# Patient Record
Sex: FEMALE | Race: WHITE | NOT HISPANIC OR LATINO | Employment: FULL TIME | URBAN - METROPOLITAN AREA
[De-identification: names, ages, dates, MRNs, and addresses within clinical notes are randomized per-mention and may not be internally consistent; named-entity substitution may affect disease eponyms.]

---

## 2019-11-27 ENCOUNTER — OFFICE VISIT (OUTPATIENT)
Dept: FAMILY MEDICINE CLINIC | Facility: CLINIC | Age: 44
End: 2019-11-27

## 2019-11-27 VITALS
BODY MASS INDEX: 36.06 KG/M2 | HEIGHT: 65 IN | DIASTOLIC BLOOD PRESSURE: 88 MMHG | HEART RATE: 71 BPM | WEIGHT: 216.4 LBS | SYSTOLIC BLOOD PRESSURE: 110 MMHG | TEMPERATURE: 98 F | OXYGEN SATURATION: 98 %

## 2019-11-27 DIAGNOSIS — R51.9 OCULAR HEADACHE: Primary | ICD-10-CM

## 2019-11-27 DIAGNOSIS — Z23 NEEDS FLU SHOT: ICD-10-CM

## 2019-11-27 DIAGNOSIS — M72.2 PLANTAR FASCIITIS OF LEFT FOOT: ICD-10-CM

## 2019-11-27 PROCEDURE — 90686 IIV4 VACC NO PRSV 0.5 ML IM: CPT | Performed by: NURSE PRACTITIONER

## 2019-11-27 PROCEDURE — 90471 IMMUNIZATION ADMIN: CPT | Performed by: NURSE PRACTITIONER

## 2019-11-27 PROCEDURE — 99213 OFFICE O/P EST LOW 20 MIN: CPT | Performed by: NURSE PRACTITIONER

## 2019-11-27 NOTE — PROGRESS NOTES
Subjective   Torri Marlow is a 43 y.o. female.     Chief Complaint   Patient presents with   • Establish Care     seen shila flowers at the old office        History of Present Illness     Patient is here today to establish care as a new patient.  She was previous with Shila hirsch, but hadn't saw her since early 2018.       C/o L heel pain for a long time.  Has had plantar fascitis in the past and it resolved.  Has gotten inserts recently and does stretches at home.     OTC: nothing.     Does want a flu shot today    PAP smear-2 years ago-normal.    Srmofljgi-brusc-beuenlks one today    NO other complaints today      The following portions of the patient's history were reviewed and updated as appropriate: allergies, current medications, past family history, past medical history, past social history, past surgical history and problem list.    History reviewed. No pertinent past medical history.    History reviewed. No pertinent surgical history.    Family History   Problem Relation Age of Onset   • Hypertension Mother    • Heart failure Mother    • Pancreatitis Mother    • Heart disease Father    • Hypertension Father        Social History     Socioeconomic History   • Marital status:      Spouse name: Not on file   • Number of children: Not on file   • Years of education: Not on file   • Highest education level: Not on file   Tobacco Use   • Smoking status: Never Smoker   • Smokeless tobacco: Never Used   Substance and Sexual Activity   • Alcohol use: No     Frequency: Never   • Drug use: No   • Sexual activity: Yes     Partners: Male     Birth control/protection: Partner's vasectomy       No current outpatient medications on file.    Review of Systems   Constitutional: Negative for fatigue and fever.   Respiratory: Negative for cough, shortness of breath and wheezing.    Cardiovascular: Negative for chest pain.   Gastrointestinal: Negative for abdominal pain, constipation, diarrhea, nausea and vomiting.  "  Genitourinary: Negative for breast discharge, breast lump, breast pain, dysuria, urgency, vaginal discharge and vaginal pain.   Musculoskeletal:        L heel pain   Skin:        Gets itchy rash on abdomen that comes and goes.   Will go away on its own without medication   Neurological: Positive for headache (ocular migraines-30 minutes then goes away completely ). Negative for dizziness.   Psychiatric/Behavioral: Negative for depressed mood. The patient is not nervous/anxious.        Objective   Vitals:    11/27/19 1314   BP: 110/88   Pulse: 71   Temp: 98 °F (36.7 °C)   SpO2: 98%   Weight: 98.2 kg (216 lb 6.4 oz)   Height: 165.1 cm (65\")     Body mass index is 36.01 kg/m².  Physical Exam   Constitutional: She is oriented to person, place, and time. She appears well-developed and well-nourished.   Cardiovascular: Normal rate, regular rhythm, normal heart sounds and intact distal pulses.   Pulmonary/Chest: Effort normal and breath sounds normal.        Neurological: She is alert and oriented to person, place, and time.   Psychiatric: She has a normal mood and affect. Her behavior is normal. Judgment and thought content normal.         Assessment/Plan   Torri was seen today for establish care.    Diagnoses and all orders for this visit:    Ocular headache    Needs flu shot    BMI 36.0-36.9,adult    Plantar fasciitis of left foot    Other orders  -     Fluarix/Fluzone/Afluria Quad>6 Months                 Patient Instructions   Plantar Fasciitis Rehab  Ask your health care provider which exercises are safe for you. Do exercises exactly as told by your health care provider and adjust them as directed. It is normal to feel mild stretching, pulling, tightness, or discomfort as you do these exercises, but you should stop right away if you feel sudden pain or your pain gets worse. Do not begin these exercises until told by your health care provider.  Stretching and range of motion exercises  These exercises warm up your " muscles and joints and improve the movement and flexibility of your foot. These exercises also help to relieve pain.  Exercise A: Plantar fascia stretch    1. Sit with your left / right leg crossed over your opposite knee.  2. Hold your heel with one hand with that thumb near your arch. With your other hand, hold your toes and gently pull them back toward the top of your foot. You should feel a stretch on the bottom of your toes or your foot or both.  3. Hold this stretch for__________ seconds.  4. Slowly release your toes and return to the starting position.  Repeat __________ times. Complete this exercise __________ times a day.  Exercise B: Gastroc, standing    1. Stand with your hands against a wall.  2. Extend your left / right leg behind you, and bend your front knee slightly.  3. Keeping your heels on the floor and keeping your back knee straight, shift your weight toward the wall without arching your back. You should feel a gentle stretch in your left / right calf.  4. Hold this position for __________ seconds.  Repeat __________ times. Complete this exercise __________ times a day.  Exercise C: Soleus, standing  1. Stand with your hands against a wall.  2. Extend your left / right leg behind you, and bend your front knee slightly.  3. Keeping your heels on the floor, bend your back knee and slightly shift your weight over the back leg. You should feel a gentle stretch deep in your calf.  4. Hold this position for __________ seconds.  Repeat __________ times. Complete this exercise __________ times a day.  Exercise D: Gastrocsoleus, standing  1. Stand with the ball of your left / right foot on a step. The ball of your foot is on the walking surface, right under your toes.  2. Keep your other foot firmly on the same step.  3. Hold onto the wall or a railing for balance.  4. Slowly lift your other foot, allowing your body weight to press your heel down over the edge of the step. You should feel a stretch in  your left / right calf.  5. Hold this position for __________ seconds.  6. Return both feet to the step.  7. Repeat this exercise with a slight bend in your left / right knee.  Repeat __________ times with your left / right knee straight and __________ times with your left / right knee bent. Complete this exercise __________ times a day.  Balance exercise  This exercise builds your balance and strength control of your arch to help take pressure off your plantar fascia.  Exercise E: Single leg stand  1. Without shoes, stand near a railing or in a doorway. You may hold onto the railing or door frame as needed.  2. Stand on your left / right foot. Keep your big toe down on the floor and try to keep your arch lifted. Do not let your foot roll inward.  3. Hold this position for __________ seconds.  4. If this exercise is too easy, you can try it with your eyes closed or while standing on a pillow.  Repeat __________ times. Complete this exercise __________ times a day.  This information is not intended to replace advice given to you by your health care provider. Make sure you discuss any questions you have with your health care provider.  Document Released: 12/18/2006 Document Revised: 08/22/2017 Document Reviewed: 10/31/2016  Elsevier Interactive Patient Education © 2019 [employer]

## 2019-11-27 NOTE — PATIENT INSTRUCTIONS
Plantar Fasciitis Rehab  Ask your health care provider which exercises are safe for you. Do exercises exactly as told by your health care provider and adjust them as directed. It is normal to feel mild stretching, pulling, tightness, or discomfort as you do these exercises, but you should stop right away if you feel sudden pain or your pain gets worse. Do not begin these exercises until told by your health care provider.  Stretching and range of motion exercises  These exercises warm up your muscles and joints and improve the movement and flexibility of your foot. These exercises also help to relieve pain.  Exercise A: Plantar fascia stretch    1. Sit with your left / right leg crossed over your opposite knee.  2. Hold your heel with one hand with that thumb near your arch. With your other hand, hold your toes and gently pull them back toward the top of your foot. You should feel a stretch on the bottom of your toes or your foot or both.  3. Hold this stretch for__________ seconds.  4. Slowly release your toes and return to the starting position.  Repeat __________ times. Complete this exercise __________ times a day.  Exercise B: Gastroc, standing    1. Stand with your hands against a wall.  2. Extend your left / right leg behind you, and bend your front knee slightly.  3. Keeping your heels on the floor and keeping your back knee straight, shift your weight toward the wall without arching your back. You should feel a gentle stretch in your left / right calf.  4. Hold this position for __________ seconds.  Repeat __________ times. Complete this exercise __________ times a day.  Exercise C: Soleus, standing  1. Stand with your hands against a wall.  2. Extend your left / right leg behind you, and bend your front knee slightly.  3. Keeping your heels on the floor, bend your back knee and slightly shift your weight over the back leg. You should feel a gentle stretch deep in your calf.  4. Hold this position for  __________ seconds.  Repeat __________ times. Complete this exercise __________ times a day.  Exercise D: Gastrocsoleus, standing  1. Stand with the ball of your left / right foot on a step. The ball of your foot is on the walking surface, right under your toes.  2. Keep your other foot firmly on the same step.  3. Hold onto the wall or a railing for balance.  4. Slowly lift your other foot, allowing your body weight to press your heel down over the edge of the step. You should feel a stretch in your left / right calf.  5. Hold this position for __________ seconds.  6. Return both feet to the step.  7. Repeat this exercise with a slight bend in your left / right knee.  Repeat __________ times with your left / right knee straight and __________ times with your left / right knee bent. Complete this exercise __________ times a day.  Balance exercise  This exercise builds your balance and strength control of your arch to help take pressure off your plantar fascia.  Exercise E: Single leg stand  1. Without shoes, stand near a railing or in a doorway. You may hold onto the railing or door frame as needed.  2. Stand on your left / right foot. Keep your big toe down on the floor and try to keep your arch lifted. Do not let your foot roll inward.  3. Hold this position for __________ seconds.  4. If this exercise is too easy, you can try it with your eyes closed or while standing on a pillow.  Repeat __________ times. Complete this exercise __________ times a day.  This information is not intended to replace advice given to you by your health care provider. Make sure you discuss any questions you have with your health care provider.  Document Released: 12/18/2006 Document Revised: 08/22/2017 Document Reviewed: 10/31/2016  Elsevier Interactive Patient Education © 2019 [employer]

## 2020-05-27 ENCOUNTER — OFFICE VISIT (OUTPATIENT)
Dept: FAMILY MEDICINE CLINIC | Facility: CLINIC | Age: 45
End: 2020-05-27

## 2020-05-27 VITALS
DIASTOLIC BLOOD PRESSURE: 66 MMHG | OXYGEN SATURATION: 99 % | BODY MASS INDEX: 37.12 KG/M2 | TEMPERATURE: 97.3 F | HEIGHT: 65 IN | WEIGHT: 222.8 LBS | HEART RATE: 68 BPM | SYSTOLIC BLOOD PRESSURE: 108 MMHG

## 2020-05-27 DIAGNOSIS — M72.2 PLANTAR FASCIITIS OF LEFT FOOT: ICD-10-CM

## 2020-05-27 DIAGNOSIS — Z00.01 ENCOUNTER FOR GENERAL ADULT MEDICAL EXAMINATION WITH ABNORMAL FINDINGS: Primary | ICD-10-CM

## 2020-05-27 DIAGNOSIS — R51.9 OCULAR HEADACHE: ICD-10-CM

## 2020-05-27 PROCEDURE — 99396 PREV VISIT EST AGE 40-64: CPT | Performed by: NURSE PRACTITIONER

## 2020-05-27 NOTE — PATIENT INSTRUCTIONS
Preventive Care 40-64 Years Old, Female  Preventive care refers to visits with your health care provider and lifestyle choices that can promote health and wellness. This includes:  · A yearly physical exam. This may also be called an annual well check.  · Regular dental visits and eye exams.  · Immunizations.  · Screening for certain conditions.  · Healthy lifestyle choices, such as eating a healthy diet, getting regular exercise, not using drugs or products that contain nicotine and tobacco, and limiting alcohol use.  What can I expect for my preventive care visit?  Physical exam  Your health care provider will check your:  · Height and weight. This may be used to calculate body mass index (BMI), which tells if you are at a healthy weight.  · Heart rate and blood pressure.  · Skin for abnormal spots.  Counseling  Your health care provider may ask you questions about your:  · Alcohol, tobacco, and drug use.  · Emotional well-being.  · Home and relationship well-being.  · Sexual activity.  · Eating habits.  · Work and work environment.  · Method of birth control.  · Menstrual cycle.  · Pregnancy history.  What immunizations do I need?    Influenza (flu) vaccine  · This is recommended every year.  Tetanus, diphtheria, and pertussis (Tdap) vaccine  · You may need a Td booster every 10 years.  Varicella (chickenpox) vaccine  · You may need this if you have not been vaccinated.  Zoster (shingles) vaccine  · You may need this after age 60.  Measles, mumps, and rubella (MMR) vaccine  · You may need at least one dose of MMR if you were born in 1957 or later. You may also need a second dose.  Pneumococcal conjugate (PCV13) vaccine  · You may need this if you have certain conditions and were not previously vaccinated.  Pneumococcal polysaccharide (PPSV23) vaccine  · You may need one or two doses if you smoke cigarettes or if you have certain conditions.  Meningococcal conjugate (MenACWY) vaccine  · You may need this if you  have certain conditions.  Hepatitis A vaccine  · You may need this if you have certain conditions or if you travel or work in places where you may be exposed to hepatitis A.  Hepatitis B vaccine  · You may need this if you have certain conditions or if you travel or work in places where you may be exposed to hepatitis B.  Haemophilus influenzae type b (Hib) vaccine  · You may need this if you have certain conditions.  Human papillomavirus (HPV) vaccine  · If recommended by your health care provider, you may need three doses over 6 months.  You may receive vaccines as individual doses or as more than one vaccine together in one shot (combination vaccines). Talk with your health care provider about the risks and benefits of combination vaccines.  What tests do I need?  Blood tests  · Lipid and cholesterol levels. These may be checked every 5 years, or more frequently if you are over 50 years old.  · Hepatitis C test.  · Hepatitis B test.  Screening  · Lung cancer screening. You may have this screening every year starting at age 55 if you have a 30-pack-year history of smoking and currently smoke or have quit within the past 15 years.  · Colorectal cancer screening. All adults should have this screening starting at age 50 and continuing until age 75. Your health care provider may recommend screening at age 45 if you are at increased risk. You will have tests every 1-10 years, depending on your results and the type of screening test.  · Diabetes screening. This is done by checking your blood sugar (glucose) after you have not eaten for a while (fasting). You may have this done every 1-3 years.  · Mammogram. This may be done every 1-2 years. Talk with your health care provider about when you should start having regular mammograms. This may depend on whether you have a family history of breast cancer.  · BRCA-related cancer screening. This may be done if you have a family history of breast, ovarian, tubal, or peritoneal  cancers.  · Pelvic exam and Pap test. This may be done every 3 years starting at age 21. Starting at age 30, this may be done every 5 years if you have a Pap test in combination with an HPV test.  Other tests  · Sexually transmitted disease (STD) testing.  · Bone density scan. This is done to screen for osteoporosis. You may have this scan if you are at high risk for osteoporosis.  Follow these instructions at home:  Eating and drinking  · Eat a diet that includes fresh fruits and vegetables, whole grains, lean protein, and low-fat dairy.  · Take vitamin and mineral supplements as recommended by your health care provider.  · Do not drink alcohol if:  ? Your health care provider tells you not to drink.  ? You are pregnant, may be pregnant, or are planning to become pregnant.  · If you drink alcohol:  ? Limit how much you have to 0-1 drink a day.  ? Be aware of how much alcohol is in your drink. In the [place] one drink equals one 12 oz bottle of beer (355 mL), one 5 oz glass of wine (148 mL), or one 1½ oz glass of hard liquor (44 mL).  Lifestyle  · Take daily care of your teeth and gums.  · Stay active. Exercise for at least 30 minutes on 5 or more days each week.  · Do not use any products that contain nicotine or tobacco, such as cigarettes, e-cigarettes, and chewing tobacco. If you need help quitting, ask your health care provider.  · If you are sexually active, practice safe sex. Use a condom or other form of birth control (contraception) in order to prevent pregnancy and STIs (sexually transmitted infections).  · If told by your health care provider, take low-dose aspirin daily starting at age 50.  What's next?  · Visit your health care provider once a year for a well check visit.  · Ask your health care provider how often you should have your eyes and teeth checked.  · Stay up to date on all vaccines.  This information is not intended to replace advice given to you by your health care provider. Make sure you  discuss any questions you have with your health care provider.  Document Released: 01/13/2017 Document Revised: 08/29/2019 Document Reviewed: 08/29/2019  Elsevier Patient Education © 2020 [employer]

## 2020-05-27 NOTE — PROGRESS NOTES
"Subjective   Torri Marlow is a 44 y.o. female.     Chief Complaint   Patient presents with   • Foot Pain     follow up        History of Present Illness       Patient is here today for follow up on plantar fasciitis pain.       The following portions of the patient's history were reviewed and updated as appropriate: allergies, current medications, past family history, past medical history, past social history, past surgical history and problem list.    Past Medical History:   Diagnosis Date   • Acute upper respiratory infection    • Acute viral pharyngitis    • Cough    • Discharge from the vagina    • Encounter for preventive health examination    • Leukocytes in urine    • Need for prophylactic vaccination and inoculation against influenza    • Need for tetanus booster    • Needs flu shot    • Pap smear for cervical cancer screening    • Ruptured tympanic membrane    • Yeast infection        History reviewed. No pertinent surgical history.    Family History   Problem Relation Age of Onset   • Hypertension Mother    • Heart failure Mother    • Pancreatitis Mother    • Heart disease Father    • Hypertension Father    • Gout Brother    • Heart disease Brother    • No Known Problems Other        Social History     Socioeconomic History   • Marital status:      Spouse name: Not on file   • Number of children: Not on file   • Years of education: Not on file   • Highest education level: Not on file   Tobacco Use   • Smoking status: Never Smoker   • Smokeless tobacco: Never Used   Substance and Sexual Activity   • Alcohol use: No     Frequency: Never   • Drug use: No   • Sexual activity: Yes     Partners: Male     Birth control/protection: Partner's vasectomy       No current outpatient medications on file.    Review of Systems    Objective   Vitals:    05/27/20 1532   BP: 108/66   Pulse: 68   Temp: 97.3 °F (36.3 °C)   SpO2: 99%   Weight: 101 kg (222 lb 12.8 oz)   Height: 165.1 cm (65\")     Body mass index is 37.08 " kg/m².  Physical Exam      Assessment/Plan   There are no diagnoses linked to this encounter.             There are no Patient Instructions on file for this visit.

## 2020-05-27 NOTE — PROGRESS NOTES
Subjective   Torri Marlow is a 44 y.o. female who presents for annual female wellness exam.  Chief Complaint   Patient presents with   • Foot Pain     follow up      Patient is here today for follow up on plantar fasciitis pain.   She states it took a long time, but is finally out of pain.     She also states that she has had a few more ocular headaches, but not many.   She states she had one last week.  Has an aura and then came back. She states usually she just goes to bed, but she wasn't able to.  Most of the time doesn't slow her down much, just are a annoyance.   Normally is no headache afterwards.      Had pap 7/2/2018  Mammogram- declines.     LABS.       Menstrual History: normal monthly.    Pregnancy History 3 pregnancy's   Sexual History: yes. 1 male partner  Contraception: partner's vasectomy  Hormone Replacement Therapy: n/a  Diet: not great.  Admits to too many calories in the last few months.  No routine with the current pandemic.  Drinks mostly water and diet coke   Exercise: walks several times weekly   Do you feel safe? yes  Have you ever been abused? denies    Tobacco use-denies  Alcohol use-denies  Drug use-denies    Mammogram: declines  Pap Smear: 7/2018  Bone Density: n/a  Colon Cancer Screening: n/a    Immunization History   Administered Date(s) Administered   • FLUARIX/FLUZONE/AFLURIA/FLULAVAL QUAD 11/27/2019   • Tdap 12/27/2016       The following portions of the patient's history were reviewed and updated as appropriate: allergies, current medications, past family history, past medical history, past social history, past surgical history and problem list.    Past Medical History:   Diagnosis Date   • Acute upper respiratory infection    • Acute viral pharyngitis    • Cough    • Discharge from the vagina    • Encounter for preventive health examination    • Leukocytes in urine    • Need for prophylactic vaccination and inoculation against influenza    • Need for tetanus booster    • Needs flu  shot    • Pap smear for cervical cancer screening    • Ruptured tympanic membrane    • Yeast infection        History reviewed. No pertinent surgical history.    Family History   Problem Relation Age of Onset   • Hypertension Mother    • Heart failure Mother    • Pancreatitis Mother    • Heart disease Father    • Hypertension Father    • Gout Brother    • Heart disease Brother    • No Known Problems Other        Social History     Socioeconomic History   • Marital status:      Spouse name: Not on file   • Number of children: Not on file   • Years of education: Not on file   • Highest education level: Not on file   Tobacco Use   • Smoking status: Never Smoker   • Smokeless tobacco: Never Used   Substance and Sexual Activity   • Alcohol use: No     Frequency: Never   • Drug use: No   • Sexual activity: Yes     Partners: Male     Birth control/protection: Partner's vasectomy       Review of Systems   Constitutional: Negative for fatigue and fever.   HENT: Negative for congestion and rhinorrhea.    Respiratory: Negative for cough, shortness of breath and wheezing.    Cardiovascular: Negative for chest pain and leg swelling.   Gastrointestinal: Negative for abdominal pain, constipation, diarrhea, nausea and vomiting.   Genitourinary: Negative for dysuria and urgency.   Skin: Negative.    Neurological: Negative for dizziness and headache.   Psychiatric/Behavioral: Negative for suicidal ideas and depressed mood. The patient is not nervous/anxious.        Objective   Vitals:    05/27/20 1532   BP: 108/66   Pulse: 68   Temp: 97.3 °F (36.3 °C)   SpO2: 99%     Body mass index is 37.08 kg/m².  Physical Exam   Constitutional: She is oriented to person, place, and time. She appears well-developed and well-nourished.   HENT:   Head: Normocephalic and atraumatic.   Right Ear: Tympanic membrane is perforated.   Left Ear: Tympanic membrane normal.   Nose: Nose normal. Right sinus exhibits no maxillary sinus tenderness and no  frontal sinus tenderness. Left sinus exhibits no maxillary sinus tenderness and no frontal sinus tenderness.   Mouth/Throat: Uvula is midline, oropharynx is clear and moist and mucous membranes are normal. Tonsils are 0 on the right. Tonsils are 0 on the left.   Eyes: Pupils are equal, round, and reactive to light.   Neck: Normal range of motion. Neck supple.   Cardiovascular: Normal rate, regular rhythm and normal heart sounds.   Pulmonary/Chest: Effort normal and breath sounds normal.   Abdominal: Soft. Bowel sounds are normal.   Musculoskeletal: Normal range of motion.   Neurological: She is alert and oriented to person, place, and time.   Skin: Skin is warm and dry.   Psychiatric: She has a normal mood and affect. Her behavior is normal. Judgment and thought content normal.         Assessment/Plan   Torri was seen today for foot pain.    Diagnoses and all orders for this visit:    Encounter for general adult medical examination with abnormal findings  -     CBC & Differential; Future  -     Comprehensive Metabolic Panel; Future  -     Lipid Panel; Future  -     TSH; Future    BMI 37.0-37.9, adult    Ocular headache    Plantar fasciitis of left foot      Will obtain labs on Friday and call with results.      Discussed loss of weight for general health.       Plantar fasciitis: if recurrence notify provider.    Ocular headache: if new issues with this, notify provider.       Discussed referral to get mammogram as general screening for breast cancer.  This is the best screening.  Patient verbalizes understanding, but does not want to proceed with option.      Follow up yearly, or sooner if labs indicate.            Discussed the importance of maintaining a healthy weight and getting regular exercise.  Educated patient on the benefits of healthy diet.  Advise follow-up annually for wellness exams.       Preventive Care 40-64 Years Old, Female  Preventive care refers to visits with your health care provider and  lifestyle choices that can promote health and wellness. This includes:  · A yearly physical exam. This may also be called an annual well check.  · Regular dental visits and eye exams.  · Immunizations.  · Screening for certain conditions.  · Healthy lifestyle choices, such as eating a healthy diet, getting regular exercise, not using drugs or products that contain nicotine and tobacco, and limiting alcohol use.  What can I expect for my preventive care visit?  Physical exam  Your health care provider will check your:  · Height and weight. This may be used to calculate body mass index (BMI), which tells if you are at a healthy weight.  · Heart rate and blood pressure.  · Skin for abnormal spots.  Counseling  Your health care provider may ask you questions about your:  · Alcohol, tobacco, and drug use.  · Emotional well-being.  · Home and relationship well-being.  · Sexual activity.  · Eating habits.  · Work and work environment.  · Method of birth control.  · Menstrual cycle.  · Pregnancy history.  What immunizations do I need?    Influenza (flu) vaccine  · This is recommended every year.  Tetanus, diphtheria, and pertussis (Tdap) vaccine  · You may need a Td booster every 10 years.  Varicella (chickenpox) vaccine  · You may need this if you have not been vaccinated.  Zoster (shingles) vaccine  · You may need this after age 60.  Measles, mumps, and rubella (MMR) vaccine  · You may need at least one dose of MMR if you were born in 1957 or later. You may also need a second dose.  Pneumococcal conjugate (PCV13) vaccine  · You may need this if you have certain conditions and were not previously vaccinated.  Pneumococcal polysaccharide (PPSV23) vaccine  · You may need one or two doses if you smoke cigarettes or if you have certain conditions.  Meningococcal conjugate (MenACWY) vaccine  · You may need this if you have certain conditions.  Hepatitis A vaccine  · You may need this if you have certain conditions or if you  travel or work in places where you may be exposed to hepatitis A.  Hepatitis B vaccine  · You may need this if you have certain conditions or if you travel or work in places where you may be exposed to hepatitis B.  Haemophilus influenzae type b (Hib) vaccine  · You may need this if you have certain conditions.  Human papillomavirus (HPV) vaccine  · If recommended by your health care provider, you may need three doses over 6 months.  You may receive vaccines as individual doses or as more than one vaccine together in one shot (combination vaccines). Talk with your health care provider about the risks and benefits of combination vaccines.  What tests do I need?  Blood tests  · Lipid and cholesterol levels. These may be checked every 5 years, or more frequently if you are over 50 years old.  · Hepatitis C test.  · Hepatitis B test.  Screening  · Lung cancer screening. You may have this screening every year starting at age 55 if you have a 30-pack-year history of smoking and currently smoke or have quit within the past 15 years.  · Colorectal cancer screening. All adults should have this screening starting at age 50 and continuing until age 75. Your health care provider may recommend screening at age 45 if you are at increased risk. You will have tests every 1-10 years, depending on your results and the type of screening test.  · Diabetes screening. This is done by checking your blood sugar (glucose) after you have not eaten for a while (fasting). You may have this done every 1-3 years.  · Mammogram. This may be done every 1-2 years. Talk with your health care provider about when you should start having regular mammograms. This may depend on whether you have a family history of breast cancer.  · BRCA-related cancer screening. This may be done if you have a family history of breast, ovarian, tubal, or peritoneal cancers.  · Pelvic exam and Pap test. This may be done every 3 years starting at age 21. Starting at age 30,  this may be done every 5 years if you have a Pap test in combination with an HPV test.  Other tests  · Sexually transmitted disease (STD) testing.  · Bone density scan. This is done to screen for osteoporosis. You may have this scan if you are at high risk for osteoporosis.  Follow these instructions at home:  Eating and drinking  · Eat a diet that includes fresh fruits and vegetables, whole grains, lean protein, and low-fat dairy.  · Take vitamin and mineral supplements as recommended by your health care provider.  · Do not drink alcohol if:  ? Your health care provider tells you not to drink.  ? You are pregnant, may be pregnant, or are planning to become pregnant.  · If you drink alcohol:  ? Limit how much you have to 0-1 drink a day.  ? Be aware of how much alcohol is in your drink. In the [place] one drink equals one 12 oz bottle of beer (355 mL), one 5 oz glass of wine (148 mL), or one 1½ oz glass of hard liquor (44 mL).  Lifestyle  · Take daily care of your teeth and gums.  · Stay active. Exercise for at least 30 minutes on 5 or more days each week.  · Do not use any products that contain nicotine or tobacco, such as cigarettes, e-cigarettes, and chewing tobacco. If you need help quitting, ask your health care provider.  · If you are sexually active, practice safe sex. Use a condom or other form of birth control (contraception) in order to prevent pregnancy and STIs (sexually transmitted infections).  · If told by your health care provider, take low-dose aspirin daily starting at age 50.  What's next?  · Visit your health care provider once a year for a well check visit.  · Ask your health care provider how often you should have your eyes and teeth checked.  · Stay up to date on all vaccines.  This information is not intended to replace advice given to you by your health care provider. Make sure you discuss any questions you have with your health care provider.  Document Released: 01/13/2017 Document Revised:  08/29/2019 Document Reviewed: 08/29/2019  Elsevier Patient Education © 2020 [employer]

## 2020-05-29 ENCOUNTER — RESULTS ENCOUNTER (OUTPATIENT)
Dept: FAMILY MEDICINE CLINIC | Facility: CLINIC | Age: 45
End: 2020-05-29

## 2020-05-29 DIAGNOSIS — Z00.01 ENCOUNTER FOR GENERAL ADULT MEDICAL EXAMINATION WITH ABNORMAL FINDINGS: ICD-10-CM

## 2020-05-30 LAB
ALBUMIN SERPL-MCNC: 4.2 G/DL (ref 3.5–5.2)
ALBUMIN/GLOB SERPL: 1.6 G/DL
ALP SERPL-CCNC: 59 U/L (ref 39–117)
ALT SERPL-CCNC: 20 U/L (ref 1–33)
AST SERPL-CCNC: 17 U/L (ref 1–32)
BASOPHILS # BLD AUTO: 0.04 10*3/MM3 (ref 0–0.2)
BASOPHILS NFR BLD AUTO: 0.5 % (ref 0–1.5)
BILIRUB SERPL-MCNC: 0.8 MG/DL (ref 0.2–1.2)
BUN SERPL-MCNC: 15 MG/DL (ref 6–20)
BUN/CREAT SERPL: 17.4 (ref 7–25)
CALCIUM SERPL-MCNC: 9.2 MG/DL (ref 8.6–10.5)
CHLORIDE SERPL-SCNC: 105 MMOL/L (ref 98–107)
CHOLEST SERPL-MCNC: 166 MG/DL (ref 0–200)
CO2 SERPL-SCNC: 24.6 MMOL/L (ref 22–29)
CREAT SERPL-MCNC: 0.86 MG/DL (ref 0.57–1)
EOSINOPHIL # BLD AUTO: 0.13 10*3/MM3 (ref 0–0.4)
EOSINOPHIL NFR BLD AUTO: 1.7 % (ref 0.3–6.2)
ERYTHROCYTE [DISTWIDTH] IN BLOOD BY AUTOMATED COUNT: 12.4 % (ref 12.3–15.4)
GLOBULIN SER CALC-MCNC: 2.6 GM/DL
GLUCOSE SERPL-MCNC: 96 MG/DL (ref 65–99)
HCT VFR BLD AUTO: 39.9 % (ref 34–46.6)
HDLC SERPL-MCNC: 49 MG/DL (ref 40–60)
HGB BLD-MCNC: 13.7 G/DL (ref 12–15.9)
IMM GRANULOCYTES # BLD AUTO: 0.02 10*3/MM3 (ref 0–0.05)
IMM GRANULOCYTES NFR BLD AUTO: 0.3 % (ref 0–0.5)
LDLC SERPL CALC-MCNC: 104 MG/DL (ref 0–100)
LYMPHOCYTES # BLD AUTO: 3 10*3/MM3 (ref 0.7–3.1)
LYMPHOCYTES NFR BLD AUTO: 40.4 % (ref 19.6–45.3)
MCH RBC QN AUTO: 31.6 PG (ref 26.6–33)
MCHC RBC AUTO-ENTMCNC: 34.3 G/DL (ref 31.5–35.7)
MCV RBC AUTO: 91.9 FL (ref 79–97)
MONOCYTES # BLD AUTO: 0.57 10*3/MM3 (ref 0.1–0.9)
MONOCYTES NFR BLD AUTO: 7.7 % (ref 5–12)
NEUTROPHILS # BLD AUTO: 3.67 10*3/MM3 (ref 1.7–7)
NEUTROPHILS NFR BLD AUTO: 49.4 % (ref 42.7–76)
NRBC BLD AUTO-RTO: 0 /100 WBC (ref 0–0.2)
PLATELET # BLD AUTO: 313 10*3/MM3 (ref 140–450)
POTASSIUM SERPL-SCNC: 4.2 MMOL/L (ref 3.5–5.2)
PROT SERPL-MCNC: 6.8 G/DL (ref 6–8.5)
RBC # BLD AUTO: 4.34 10*6/MM3 (ref 3.77–5.28)
SODIUM SERPL-SCNC: 140 MMOL/L (ref 136–145)
TRIGL SERPL-MCNC: 66 MG/DL (ref 0–150)
TSH SERPL DL<=0.005 MIU/L-ACNC: 4.53 UIU/ML (ref 0.27–4.2)
VLDLC SERPL CALC-MCNC: 13.2 MG/DL
WBC # BLD AUTO: 7.43 10*3/MM3 (ref 3.4–10.8)

## 2020-06-01 PROBLEM — R79.89 ABNORMAL THYROID BLOOD TEST: Status: ACTIVE | Noted: 2020-06-01

## 2020-06-02 LAB
T3FREE SERPL-MCNC: 3.1 PG/ML (ref 2–4.4)
T4 FREE SERPL-MCNC: 1.31 NG/DL (ref 0.93–1.7)
WRITTEN AUTHORIZATION: NORMAL

## 2020-07-29 ENCOUNTER — OFFICE VISIT (OUTPATIENT)
Dept: FAMILY MEDICINE CLINIC | Facility: CLINIC | Age: 45
End: 2020-07-29

## 2020-07-29 ENCOUNTER — TELEPHONE (OUTPATIENT)
Dept: FAMILY MEDICINE CLINIC | Facility: CLINIC | Age: 45
End: 2020-07-29

## 2020-07-29 VITALS
HEIGHT: 65 IN | HEART RATE: 74 BPM | TEMPERATURE: 97.3 F | WEIGHT: 221 LBS | OXYGEN SATURATION: 98 % | DIASTOLIC BLOOD PRESSURE: 78 MMHG | BODY MASS INDEX: 36.82 KG/M2 | SYSTOLIC BLOOD PRESSURE: 112 MMHG

## 2020-07-29 DIAGNOSIS — H60.311 ACUTE DIFFUSE OTITIS EXTERNA OF RIGHT EAR: Primary | ICD-10-CM

## 2020-07-29 PROCEDURE — 99213 OFFICE O/P EST LOW 20 MIN: CPT | Performed by: NURSE PRACTITIONER

## 2020-07-29 RX ORDER — CIPROFLOXACIN AND DEXAMETHASONE 3; 1 MG/ML; MG/ML
4 SUSPENSION/ DROPS AURICULAR (OTIC) 2 TIMES DAILY
Qty: 7.5 ML | Refills: 0 | Status: SHIPPED | OUTPATIENT
Start: 2020-07-29 | End: 2020-07-29

## 2020-07-29 NOTE — TELEPHONE ENCOUNTER
Pharmacy sent a fax stating the pts copay for ciprodex otic suspension is $244.  Can you send in something else

## 2020-07-29 NOTE — PROGRESS NOTES
Subjective   Torri Marlow is a 44 y.o. female.     Chief Complaint   Patient presents with   • Earache     RIGHT EAR PAIN, STATES SHE HAS A HOLE IN THAT EAR DRUM. SHE HAS BEEN SWIMMING.         History of Present Illness     Patient is here today with complaint of drainage from here right ear that started 7/15 and then she thought it started to get better because it stopped, but then pain started last night. Then she noticed blood this morning.   She has had a whole since age 16.  Had no problems since then until now.  She also thinks she scratched it.             The following portions of the patient's history were reviewed and updated as appropriate: allergies, current medications, past family history, past medical history, past social history, past surgical history and problem list.    Past Medical History:   Diagnosis Date   • Acute upper respiratory infection    • Acute viral pharyngitis    • Cough    • Discharge from the vagina    • Encounter for preventive health examination    • Leukocytes in urine    • Need for prophylactic vaccination and inoculation against influenza    • Need for tetanus booster    • Needs flu shot    • Pap smear for cervical cancer screening    • Ruptured tympanic membrane    • Yeast infection        History reviewed. No pertinent surgical history.    Family History   Problem Relation Age of Onset   • Hypertension Mother    • Heart failure Mother    • Pancreatitis Mother    • Heart disease Father    • Hypertension Father    • Gout Brother    • Heart disease Brother    • No Known Problems Other        Social History     Socioeconomic History   • Marital status:      Spouse name: Not on file   • Number of children: Not on file   • Years of education: Not on file   • Highest education level: Not on file   Tobacco Use   • Smoking status: Never Smoker   • Smokeless tobacco: Never Used   Substance and Sexual Activity   • Alcohol use: No     Frequency: Never   • Drug use: No   • Sexual  "activity: Yes     Partners: Male     Birth control/protection: Partner's vasectomy         Current Outpatient Medications:   •  ciprofloxacin-dexamethasone (Ciprodex) 0.3-0.1 % otic suspension, Administer 4 drops to the right ear 2 (Two) Times a Day., Disp: 7.5 mL, Rfl: 0    Review of Systems   Constitutional: Negative for fatigue and fever.   HENT: Positive for ear discharge and ear pain. Negative for congestion, nosebleeds and sore throat.    Respiratory: Negative for cough, shortness of breath and wheezing.    Gastrointestinal: Negative for abdominal pain, constipation, diarrhea, nausea and vomiting.   Genitourinary: Negative for dysuria and urgency.   Skin: Negative.    Neurological: Negative for dizziness and headache.   Psychiatric/Behavioral: Negative for depressed mood. The patient is not nervous/anxious.        Objective   Vitals:    07/29/20 1251   BP: 112/78   Pulse: 74   Temp: 97.3 °F (36.3 °C)   SpO2: 98%   Weight: 100 kg (221 lb)   Height: 165.1 cm (65\")     Body mass index is 36.78 kg/m².  Physical Exam   Constitutional: She is oriented to person, place, and time. She appears well-developed and well-nourished.   HENT:   Head: Normocephalic and atraumatic.   Right Ear: There is drainage and swelling. There is mastoid tenderness. Tympanic membrane mobility is abnormal.   Left Ear: Tympanic membrane mobility is abnormal.   Nose: Rhinorrhea present. Right sinus exhibits no maxillary sinus tenderness and no frontal sinus tenderness. Left sinus exhibits no maxillary sinus tenderness and no frontal sinus tenderness.   Mouth/Throat: Uvula is midline, oropharynx is clear and moist and mucous membranes are normal. Tonsils are 0 on the right. Tonsils are 0 on the left.   Neurological: She is alert and oriented to person, place, and time.   Psychiatric: She has a normal mood and affect. Her behavior is normal. Judgment and thought content normal.         Assessment/Plan   Torri was seen today for " earache.    Diagnoses and all orders for this visit:    Acute diffuse otitis externa of right ear    Other orders  -     ciprofloxacin-dexamethasone (Ciprodex) 0.3-0.1 % otic suspension; Administer 4 drops to the right ear 2 (Two) Times a Day.      Start ear drop.  Suggested to start flonase.    Follow up as needed.    If no improvement consider ENT referral.             Patient Instructions   Otitis Externa    Otitis externa is an infection of the outer ear canal. The outer ear canal is the area between the outside of the ear and the eardrum. Otitis externa is sometimes called swimmer's ear.  What are the causes?  Common causes of this condition include:  · Swimming in dirty water.  · Moisture in the ear.  · An injury to the inside of the ear.  · An object stuck in the ear.  · A cut or scrape on the outside of the ear.  What increases the risk?  You are more likely to get this condition if you go swimming often.  What are the signs or symptoms?  · Itching in the ear. This is often the first symptom.  · Swelling of the ear.  · Redness in the ear.  · Ear pain. The pain may get worse when you pull on your ear.  · Pus coming from the ear.  How is this treated?  This condition may be treated with:  · Antibiotic ear drops. These are often given for 10-14 days.  · Medicines to reduce itching and swelling.  Follow these instructions at home:  · If you were given antibiotic ear drops, use them as told by your doctor. Do not stop using them even if your condition gets better.  · Take over-the-counter and prescription medicines only as told by your doctor.  · Avoid getting water in your ears as told by your doctor. You may be told to avoid swimming or water sports for a few days.  · Keep all follow-up visits as told by your doctor. This is important.  How is this prevented?  · Keep your ears dry. Use the corner of a towel to dry your ears after you swim or bathe.  · Try not to scratch or put things in your ear. Doing these  things makes it easier for germs to grow in your ear.  · Avoid swimming in lakes, dirty water, or pools that may not have the right amount of a chemical called chlorine.  Contact a doctor if:  · You have a fever.  · Your ear is still red, swollen, or painful after 3 days.  · You still have pus coming from your ear after 3 days.  · Your redness, swelling, or pain gets worse.  · You have a really bad headache.  · You have redness, swelling, pain, or tenderness behind your ear.  Summary  · Otitis externa is an infection of the outer ear canal.  · Symptoms include pain, redness, and swelling of the ear.  · If you were given antibiotic ear drops, use them as told by your doctor. Do not stop using them even if your condition gets better.  · Try not to scratch or put things in your ear.  This information is not intended to replace advice given to you by your health care provider. Make sure you discuss any questions you have with your health care provider.  Document Released: 06/05/2009 Document Revised: 05/24/2019 Document Reviewed: 05/24/2019  Elsevier Patient Education © 2020 [employer]

## 2020-07-29 NOTE — TELEPHONE ENCOUNTER
Please let her daughter know that all that was put on hold on 7/21 and they were waiting for call from them according to the .  They had my order and had sent in from 6/30.  They will just have to discuss with them for recurring orders, but it is okayed on my end.  From 6/30/2020

## 2021-04-06 ENCOUNTER — BULK ORDERING (OUTPATIENT)
Dept: CASE MANAGEMENT | Facility: OTHER | Age: 46
End: 2021-04-06

## 2021-04-06 DIAGNOSIS — Z23 IMMUNIZATION DUE: ICD-10-CM

## 2022-09-27 ENCOUNTER — OFFICE VISIT (OUTPATIENT)
Dept: FAMILY MEDICINE CLINIC | Facility: CLINIC | Age: 47
End: 2022-09-27

## 2022-09-27 VITALS
DIASTOLIC BLOOD PRESSURE: 82 MMHG | SYSTOLIC BLOOD PRESSURE: 128 MMHG | BODY MASS INDEX: 36.46 KG/M2 | HEART RATE: 91 BPM | HEIGHT: 65 IN | OXYGEN SATURATION: 99 % | TEMPERATURE: 96.7 F | WEIGHT: 218.8 LBS

## 2022-09-27 DIAGNOSIS — J02.9 PHARYNGITIS, UNSPECIFIED ETIOLOGY: ICD-10-CM

## 2022-09-27 DIAGNOSIS — R09.81 NASAL CONGESTION: ICD-10-CM

## 2022-09-27 DIAGNOSIS — J06.9 ACUTE UPPER RESPIRATORY INFECTION: Primary | ICD-10-CM

## 2022-09-27 DIAGNOSIS — J02.9 SORE THROAT: ICD-10-CM

## 2022-09-27 LAB
EXPIRATION DATE: NORMAL
FLUAV AG NPH QL: NEGATIVE
FLUBV AG NPH QL: NEGATIVE
INTERNAL CONTROL: NORMAL
Lab: NORMAL
S PYO AG THROAT QL: NEGATIVE
SARS-COV-2 AG UPPER RESP QL IA.RAPID: NOT DETECTED

## 2022-09-27 PROCEDURE — 87880 STREP A ASSAY W/OPTIC: CPT | Performed by: STUDENT IN AN ORGANIZED HEALTH CARE EDUCATION/TRAINING PROGRAM

## 2022-09-27 PROCEDURE — 87426 SARSCOV CORONAVIRUS AG IA: CPT | Performed by: STUDENT IN AN ORGANIZED HEALTH CARE EDUCATION/TRAINING PROGRAM

## 2022-09-27 PROCEDURE — 87804 INFLUENZA ASSAY W/OPTIC: CPT | Performed by: STUDENT IN AN ORGANIZED HEALTH CARE EDUCATION/TRAINING PROGRAM

## 2022-09-27 PROCEDURE — 99213 OFFICE O/P EST LOW 20 MIN: CPT | Performed by: STUDENT IN AN ORGANIZED HEALTH CARE EDUCATION/TRAINING PROGRAM

## 2022-09-27 RX ORDER — GUAIFENESIN, PSEUDOEPHEDRINE HYDROCHLORIDE 600; 60 MG/1; MG/1
1 TABLET, EXTENDED RELEASE ORAL EVERY 12 HOURS
Qty: 60 TABLET | Refills: 0 | Status: SHIPPED | OUTPATIENT
Start: 2022-09-27 | End: 2022-10-27

## 2022-09-27 RX ORDER — BENZONATATE 100 MG/1
100 CAPSULE ORAL 3 TIMES DAILY PRN
Qty: 45 CAPSULE | Refills: 1 | Status: SHIPPED | OUTPATIENT
Start: 2022-09-27 | End: 2022-12-30

## 2022-09-27 NOTE — PATIENT INSTRUCTIONS
Your symptoms symptoms and exam are consistent with a viral upper respiratory tract infection with pharyngitis.  I sent in a prescription for Tessalon Perles that should help with your sore throat.  You can also try salt water gargles.  I have also sent in a prescription for Mucinex-pseudoepinephrine which should help with your congestion.  Be sure to drink plenty of fluids, especially with these medications.  Saline nasal spray and/your Maryana rinse will also help your symptoms.  You can alternate between Tylenol and ibuprofen for fevers and fatigue.  If you not feeling better in the next couple of days or if you feel like symptoms are worsening feel free to call us.  If you develop tightness in your chest, shortness of breath.  Go to ER right away.  Feel free to call us if you have any further concerns.

## 2022-09-27 NOTE — PROGRESS NOTES
"Chief Complaint  Sore Throat, Nasal Congestion, and Ear Drainage    Subjective        Torri Marlow presents to [facility]  History of Present Illness  Sore throat, congestion, cough.  Started feeling symptoms Saturday with a little cough, has gotten worse since.  100.4F last night.  Legs hurting.  Drinking plenty.  No blood in stool, back pain, n/v.  Some ear pressure change, R ear drainage.  Took Advil Flu/cold last night.  Also yesterday morning Advil and Dayquil.  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   3 children at home and , seem pretty much healthy.  Some issues with seasonal allergies but not really this year.      Objective   Vital Signs:  /82   Pulse 91   Temp 96.7 °F (35.9 °C)   Ht 165.1 cm (65\")   Wt 99.2 kg (218 lb 12.8 oz)   SpO2 99%   BMI 36.41 kg/m²   Estimated body mass index is 36.41 kg/m² as calculated from the following:    Height as of this encounter: 165.1 cm (65\").    Weight as of this encounter: 99.2 kg (218 lb 12.8 oz).          Physical Exam  Vitals reviewed.   Constitutional:       General: She is in acute distress.      Appearance: Normal appearance.   HENT:      Head: Normocephalic and atraumatic. No right periorbital erythema or left periorbital erythema.      Jaw: No tenderness.      Salivary Glands: Right salivary gland is not diffusely enlarged or tender. Left salivary gland is not diffusely enlarged or tender.      Right Ear: Ear canal and external ear normal. No drainage or tenderness. There is no impacted cerumen. No mastoid tenderness. Tympanic membrane is perforated.      Left Ear: Ear canal and external ear normal. No drainage or tenderness. There is no impacted cerumen. No mastoid tenderness. Tympanic membrane is scarred. Tympanic membrane is not bulging.      Ears:      Comments: Right TM perforation, left TM scarring.     Nose: Septal deviation (severe), mucosal edema, congestion and rhinorrhea present. " Rhinorrhea is clear.      Right Sinus: No maxillary sinus tenderness or frontal sinus tenderness.      Left Sinus: No maxillary sinus tenderness or frontal sinus tenderness.      Mouth/Throat:      Mouth: Mucous membranes are moist.      Pharynx: Oropharynx is clear. Posterior oropharyngeal erythema present. No oropharyngeal exudate.      Tonsils: No tonsillar exudate or tonsillar abscesses.   Eyes:      General: No scleral icterus.        Right eye: No discharge.         Left eye: No discharge.      Extraocular Movements: Extraocular movements intact.      Conjunctiva/sclera: Conjunctivae normal.   Neck:      Thyroid: No thyromegaly or thyroid tenderness.   Cardiovascular:      Rate and Rhythm: Normal rate and regular rhythm.      Heart sounds: Normal heart sounds. No murmur heard.    No friction rub. No gallop.   Pulmonary:      Effort: Pulmonary effort is normal.      Breath sounds: Normal breath sounds. No wheezing, rhonchi or rales.   Musculoskeletal:      Cervical back: No edema.   Lymphadenopathy:      Cervical: No cervical adenopathy.   Skin:     General: Skin is warm and dry.   Neurological:      General: No focal deficit present.      Mental Status: She is alert.   Psychiatric:         Mood and Affect: Mood normal.         Behavior: Behavior normal.        Result Review :                Assessment and Plan   Diagnoses and all orders for this visit:    1. Acute upper respiratory infection (Primary)    2. Pharyngitis, unspecified etiology    3. Sore throat  -     POCT INDU SARS-CoV-2 Antigen KIAH  -     POC Influenza A / B  -     POC Rapid Strep A  -     benzonatate (Tessalon Perles) 100 MG capsule; Take 1 capsule by mouth 3 (Three) Times a Day As Needed for Cough.  Dispense: 45 capsule; Refill: 1    4. Nasal congestion  -     POCT INDU SARS-CoV-2 Antigen KIAH  -     POC Influenza A / B  -     POC Rapid Strep A  -     pseudoephedrine-guaifenesin (MUCINEX D)  MG per 12 hr tablet; Take 1 tablet by mouth  Every 12 (Twelve) Hours for 30 days.  Dispense: 60 tablet; Refill: 0    Negative strep, negative flu, negative COVID.  Afebrile in clinic today.  Symptoms and exam consistent with viral URI with pharyngitis.  Supportive care, Mucinex D, ibuprofen/Tylenol.  Discussed role for saline nasal spray/[place] rinse, and antihistamines.  Return precautions given.  Patient will call if not improved in the next few days.       Follow Up {Instructions Charge Capture  Follow-up Communications :23}  Return in about 3 months (around 12/27/2022) for Annual physical with PAP.  Patient was given instructions and counseling regarding her condition or for health maintenance advice. Please see specific information pulled into the AVS if appropriate.

## 2022-09-28 ENCOUNTER — PATIENT MESSAGE (OUTPATIENT)
Dept: FAMILY MEDICINE CLINIC | Facility: CLINIC | Age: 47
End: 2022-09-28

## 2022-09-28 RX ORDER — AMOXICILLIN AND CLAVULANATE POTASSIUM 875; 125 MG/1; MG/1
1 TABLET, FILM COATED ORAL 2 TIMES DAILY
Qty: 20 TABLET | Refills: 0 | Status: SHIPPED | OUTPATIENT
Start: 2022-09-28 | End: 2022-12-30

## 2022-09-28 NOTE — TELEPHONE ENCOUNTER
Discussed worsening right-sided throat pain and lymphadenopathy since clinic visit yesterday with patient on phone.  Otherwise patient's symptoms about the same.  Prescription for Augmentin sent.  Patient will follow-up if conditions worsen.

## 2022-09-28 NOTE — TELEPHONE ENCOUNTER
From: Torri Marlow  To: Eleazar Aguayo MD  Sent: 9/28/2022 11:04 AM EDT  Subject: Swollen gland in neck    Hi Dr. Aguayo. The lymph gland on the right side of my neck is swollen. I’m also noticing more pain on that side of my throat when I swallow. Could that possibly be from the issues I’m having with my right ear? Thank you.

## 2022-12-30 ENCOUNTER — OFFICE VISIT (OUTPATIENT)
Dept: FAMILY MEDICINE CLINIC | Facility: CLINIC | Age: 47
End: 2022-12-30

## 2022-12-30 VITALS
HEIGHT: 65 IN | OXYGEN SATURATION: 99 % | SYSTOLIC BLOOD PRESSURE: 124 MMHG | HEART RATE: 84 BPM | DIASTOLIC BLOOD PRESSURE: 86 MMHG | BODY MASS INDEX: 36.52 KG/M2 | WEIGHT: 219.2 LBS | TEMPERATURE: 97.8 F

## 2022-12-30 DIAGNOSIS — E66.09 CLASS 2 OBESITY DUE TO EXCESS CALORIES WITHOUT SERIOUS COMORBIDITY WITH BODY MASS INDEX (BMI) OF 36.0 TO 36.9 IN ADULT: ICD-10-CM

## 2022-12-30 DIAGNOSIS — Z12.4 CERVICAL CANCER SCREENING: Primary | ICD-10-CM

## 2022-12-30 DIAGNOSIS — Z13.220 SCREENING FOR LIPID DISORDERS: ICD-10-CM

## 2022-12-30 DIAGNOSIS — Z00.01 ENCOUNTER FOR GENERAL ADULT MEDICAL EXAMINATION WITH ABNORMAL FINDINGS: ICD-10-CM

## 2022-12-30 DIAGNOSIS — Z11.59 ENCOUNTER FOR HEPATITIS C SCREENING TEST FOR LOW RISK PATIENT: ICD-10-CM

## 2022-12-30 DIAGNOSIS — Z12.31 ENCOUNTER FOR SCREENING MAMMOGRAM FOR MALIGNANT NEOPLASM OF BREAST: ICD-10-CM

## 2022-12-30 DIAGNOSIS — Z12.11 SCREENING FOR COLON CANCER: ICD-10-CM

## 2022-12-30 PROCEDURE — 99396 PREV VISIT EST AGE 40-64: CPT | Performed by: NURSE PRACTITIONER

## 2022-12-30 NOTE — PROGRESS NOTES
Subjective   Torri Marlow is a 47 y.o. female who presents for annual female wellness exam.  Chief Complaint   Patient presents with   • Annual Exam   • Gynecologic Exam     Patient is here today for complete physical. Has no new complaints today       Menstrual History: regular  Pregnancy History: 3  Sexual History: 1 male partner  Contraception: partners vasectomy  Hormone Replacement Therapy: n/a  Diet: ok, could be better. Drinks mostly water and diet soda  Exercise: not currently, but during the rest of year walks daily.     Do you feel safe? reports she does  Have you ever been abused? denies  Dentist: twice yearly  Eye doctor: yesterday    Mammogram: never  Pap Smear: due  Bone Density: n/a  Colon Cancer Screening: never    Immunization History   Administered Date(s) Administered   • COVID-19 (MODERNA) 1st, 2nd, 3rd Dose Only 02/04/2021, 03/04/2021, 11/04/2021   • COVID-19 (PFIZER) BIVALENT BOOSTER 12+YRS 10/06/2022   • Flu Vaccine Intradermal Quad 18-64YR 12/27/2016   • FluLaval/Fluzone >6mos 11/27/2019   • Influenza, Unspecified 12/01/2015, 12/31/2016, 10/20/2021, 10/06/2022   • Tdap 12/27/2016       The following portions of the patient's history were reviewed and updated as appropriate: allergies, current medications, past family history, past medical history, past social history, past surgical history and problem list.    Past Medical History:   Diagnosis Date   • Acute upper respiratory infection    • Acute viral pharyngitis    • Allergic     Seasonal   • Cough    • Discharge from the vagina    • Encounter for preventive health examination    • Headache     Ocular migraines   • HL (hearing loss) 16 years old    Perforation in right ear drum   • Leukocytes in urine    • Need for prophylactic vaccination and inoculation against influenza    • Need for tetanus booster    • Needs flu shot    • Obesity    • Pap smear for cervical cancer screening    • Ruptured tympanic membrane    • Yeast infection         History reviewed. No pertinent surgical history.    Family History   Problem Relation Age of Onset   • Hypertension Mother    • Heart failure Mother    • Pancreatitis Mother    • Heart disease Father    • Hypertension Father    • Hyperlipidemia Father    • Gout Brother    • Heart disease Brother    • No Known Problems Other    • Cancer Maternal Grandmother         Renal cancer   • Cancer Sister         Renal cancer   • Cancer Maternal Aunt         Breast cancer   • Cancer Maternal Uncle         Renal cancer       Social History     Socioeconomic History   • Marital status:    Tobacco Use   • Smoking status: Never   • Smokeless tobacco: Never   Substance and Sexual Activity   • Alcohol use: Never   • Drug use: Never   • Sexual activity: Yes     Partners: Male     Birth control/protection: Vasectomy       Review of Systems    Objective   Vitals:    12/30/22 0859   BP: 124/86   Pulse: 84   Temp: 97.8 °F (36.6 °C)   SpO2: 99%     Body mass index is 36.48 kg/m².  Physical Exam  Constitutional:       Appearance: Normal appearance.   HENT:      Head: Normocephalic and atraumatic.      Right Ear: Tympanic membrane, ear canal and external ear normal.      Left Ear: Tympanic membrane, ear canal and external ear normal.      Nose: Nose normal.      Mouth/Throat:      Mouth: Mucous membranes are moist.   Cardiovascular:      Rate and Rhythm: Normal rate and regular rhythm.      Pulses: Normal pulses.   Pulmonary:      Effort: Pulmonary effort is normal.      Breath sounds: Normal breath sounds.   Genitourinary:     Exam position: Supine.      Vagina: Normal.      Cervix: Normal.   Musculoskeletal:         General: Normal range of motion.   Skin:     General: Skin is warm and dry.   Neurological:      Mental Status: She is alert and oriented to person, place, and time.   Psychiatric:         Mood and Affect: Mood normal.         Behavior: Behavior normal.         Thought Content: Thought content normal.          Judgment: Judgment normal.           Assessment & Plan   Diagnoses and all orders for this visit:    1. Cervical cancer screening (Primary)  -     IGP, Aptima HPV, CtNg Age Gdln    2. Encounter for general adult medical examination with abnormal findings    3. Class 2 obesity due to excess calories without serious comorbidity with body mass index (BMI) of 36.0 to 36.9 in adult  -     CBC & Differential  -     Comprehensive Metabolic Panel  -     Lipid Panel  -     TSH    4. Screening for lipid disorders  -     Lipid Panel    5. Encounter for hepatitis C screening test for low risk patient  -     Hepatitis C antibody    6. Encounter for screening mammogram for malignant neoplasm of breast  -     Mammo Screening Bilateral With CAD; Future    7. Screening for colon cancer  -     Cologuard - Stool, Per Rectum; Future    Physical complete for patient.  We discussed the need for weight loss to healthy BMI between 18 and 25.  This can be achieved with reduction of calories and carbs in diet and inclusion of exercise.  She verbalized understanding    Will obtain labs today and call with results any changes needed plan of care    Will call with results of Pap smear and any changes needed plan of care    Mammogram and Cologuard ordered for further evaluation.  Follow-up yearly for physical and sooner as needed           Discussed the importance of maintaining a healthy weight and getting regular exercise.  Educated patient on the benefits of healthy diet.  Advise follow-up annually for wellness exams.

## 2022-12-31 LAB
ALBUMIN SERPL-MCNC: 4.5 G/DL (ref 3.5–5.2)
ALBUMIN/GLOB SERPL: 1.9 G/DL
ALP SERPL-CCNC: 65 U/L (ref 39–117)
ALT SERPL-CCNC: 24 U/L (ref 1–33)
AST SERPL-CCNC: 15 U/L (ref 1–32)
BASOPHILS # BLD AUTO: 0.03 10*3/MM3 (ref 0–0.2)
BASOPHILS NFR BLD AUTO: 0.4 % (ref 0–1.5)
BILIRUB SERPL-MCNC: 0.6 MG/DL (ref 0–1.2)
BUN SERPL-MCNC: 14 MG/DL (ref 6–20)
BUN/CREAT SERPL: 15.9 (ref 7–25)
CALCIUM SERPL-MCNC: 9.5 MG/DL (ref 8.6–10.5)
CHLORIDE SERPL-SCNC: 104 MMOL/L (ref 98–107)
CHOLEST SERPL-MCNC: 194 MG/DL (ref 0–200)
CO2 SERPL-SCNC: 25.1 MMOL/L (ref 22–29)
CREAT SERPL-MCNC: 0.88 MG/DL (ref 0.57–1)
EGFRCR SERPLBLD CKD-EPI 2021: 82.7 ML/MIN/1.73
EOSINOPHIL # BLD AUTO: 0.15 10*3/MM3 (ref 0–0.4)
EOSINOPHIL NFR BLD AUTO: 2.2 % (ref 0.3–6.2)
ERYTHROCYTE [DISTWIDTH] IN BLOOD BY AUTOMATED COUNT: 12.2 % (ref 12.3–15.4)
GLOBULIN SER CALC-MCNC: 2.4 GM/DL
GLUCOSE SERPL-MCNC: 97 MG/DL (ref 65–99)
HCT VFR BLD AUTO: 41.9 % (ref 34–46.6)
HCV AB S/CO SERPL IA: <0.1 S/CO RATIO (ref 0–0.9)
HDLC SERPL-MCNC: 55 MG/DL (ref 40–60)
HGB BLD-MCNC: 14 G/DL (ref 12–15.9)
IMM GRANULOCYTES # BLD AUTO: 0.01 10*3/MM3 (ref 0–0.05)
IMM GRANULOCYTES NFR BLD AUTO: 0.1 % (ref 0–0.5)
LDLC SERPL CALC-MCNC: 127 MG/DL (ref 0–100)
LYMPHOCYTES # BLD AUTO: 2.44 10*3/MM3 (ref 0.7–3.1)
LYMPHOCYTES NFR BLD AUTO: 35 % (ref 19.6–45.3)
MCH RBC QN AUTO: 31 PG (ref 26.6–33)
MCHC RBC AUTO-ENTMCNC: 33.4 G/DL (ref 31.5–35.7)
MCV RBC AUTO: 92.7 FL (ref 79–97)
MONOCYTES # BLD AUTO: 0.54 10*3/MM3 (ref 0.1–0.9)
MONOCYTES NFR BLD AUTO: 7.7 % (ref 5–12)
NEUTROPHILS # BLD AUTO: 3.8 10*3/MM3 (ref 1.7–7)
NEUTROPHILS NFR BLD AUTO: 54.6 % (ref 42.7–76)
NRBC BLD AUTO-RTO: 0 /100 WBC (ref 0–0.2)
PLATELET # BLD AUTO: 323 10*3/MM3 (ref 140–450)
POTASSIUM SERPL-SCNC: 4.7 MMOL/L (ref 3.5–5.2)
PROT SERPL-MCNC: 6.9 G/DL (ref 6–8.5)
RBC # BLD AUTO: 4.52 10*6/MM3 (ref 3.77–5.28)
SODIUM SERPL-SCNC: 140 MMOL/L (ref 136–145)
TRIGL SERPL-MCNC: 65 MG/DL (ref 0–150)
TSH SERPL DL<=0.005 MIU/L-ACNC: 2.67 UIU/ML (ref 0.27–4.2)
VLDLC SERPL CALC-MCNC: 12 MG/DL (ref 5–40)
WBC # BLD AUTO: 6.97 10*3/MM3 (ref 3.4–10.8)

## 2023-01-05 LAB
AGE GDLN ACOG TESTING: NORMAL
CYTOLOGIST CVX/VAG CYTO: NORMAL
CYTOLOGY CVX/VAG DOC CYTO: NORMAL
CYTOLOGY CVX/VAG DOC THIN PREP: NORMAL
DX ICD CODE: NORMAL
HIV 1 & 2 AB SER-IMP: NORMAL
HPV GENOTYPE REFLEX: NORMAL
HPV I/H RISK 4 DNA CVX QL PROBE+SIG AMP: NEGATIVE
OTHER STN SPEC: NORMAL
STAT OF ADQ CVX/VAG CYTO-IMP: NORMAL

## 2023-01-10 DIAGNOSIS — Z12.31 ENCOUNTER FOR SCREENING MAMMOGRAM FOR MALIGNANT NEOPLASM OF BREAST: ICD-10-CM

## 2023-01-11 NOTE — PROGRESS NOTES
Please call patient with results of labs.Please let patient know that mammogram is negative for abnormal findings.  No suspicious mass or distortion noted.  Recommend repeat screening mammogram in 1 year.

## 2023-01-20 ENCOUNTER — OFFICE VISIT (OUTPATIENT)
Dept: FAMILY MEDICINE CLINIC | Facility: CLINIC | Age: 48
End: 2023-01-20
Payer: COMMERCIAL

## 2023-01-20 VITALS
HEART RATE: 88 BPM | OXYGEN SATURATION: 98 % | HEIGHT: 65 IN | WEIGHT: 219.2 LBS | SYSTOLIC BLOOD PRESSURE: 142 MMHG | TEMPERATURE: 99.6 F | DIASTOLIC BLOOD PRESSURE: 88 MMHG | BODY MASS INDEX: 36.52 KG/M2

## 2023-01-20 DIAGNOSIS — J02.0 STREP THROAT: ICD-10-CM

## 2023-01-20 DIAGNOSIS — J02.9 SORE THROAT: Primary | ICD-10-CM

## 2023-01-20 LAB
EXPIRATION DATE: ABNORMAL
EXPIRATION DATE: NORMAL
FLUAV AG UPPER RESP QL IA.RAPID: NOT DETECTED
FLUBV AG UPPER RESP QL IA.RAPID: NOT DETECTED
INTERNAL CONTROL: ABNORMAL
INTERNAL CONTROL: NORMAL
Lab: ABNORMAL
Lab: NORMAL
S PYO AG THROAT QL: POSITIVE
SARS-COV-2 AG UPPER RESP QL IA.RAPID: NOT DETECTED

## 2023-01-20 PROCEDURE — 87880 STREP A ASSAY W/OPTIC: CPT | Performed by: NURSE PRACTITIONER

## 2023-01-20 PROCEDURE — 99214 OFFICE O/P EST MOD 30 MIN: CPT | Performed by: NURSE PRACTITIONER

## 2023-01-20 PROCEDURE — 87428 SARSCOV & INF VIR A&B AG IA: CPT | Performed by: NURSE PRACTITIONER

## 2023-01-20 RX ORDER — AMOXICILLIN AND CLAVULANATE POTASSIUM 875; 125 MG/1; MG/1
1 TABLET, FILM COATED ORAL 2 TIMES DAILY
Qty: 14 TABLET | Refills: 0 | Status: SHIPPED | OUTPATIENT
Start: 2023-01-20 | End: 2023-02-20

## 2023-01-20 NOTE — PROGRESS NOTES
"Chief Complaint  Sore Throat, Fever, Cough, and Nasal Congestion    Subjective        Torri Marlow presents to [facility]  History of Present Illness     Patient is here today with complaint of sore throat that started last week. Thinks she may have had a URI last week and still having symptoms from this as well    Objective   Vital Signs:  /88   Pulse 88   Temp 99.6 °F (37.6 °C)   Ht 165.1 cm (65\")   Wt 99.4 kg (219 lb 3.2 oz)   SpO2 98%   BMI 36.48 kg/m²   Estimated body mass index is 36.48 kg/m² as calculated from the following:    Height as of this encounter: 165.1 cm (65\").    Weight as of this encounter: 99.4 kg (219 lb 3.2 oz).             Physical Exam  Constitutional:       Appearance: Normal appearance.   HENT:      Head: Normocephalic and atraumatic.      Right Ear: Tympanic membrane has decreased mobility.      Left Ear: Tympanic membrane has decreased mobility.      Nose: Rhinorrhea present.      Right Sinus: No maxillary sinus tenderness or frontal sinus tenderness.      Left Sinus: No maxillary sinus tenderness or frontal sinus tenderness.      Mouth/Throat:      Mouth: Mucous membranes are moist.      Pharynx: Oropharyngeal exudate and posterior oropharyngeal erythema present.      Tonsils: Tonsillar exudate present.   Cardiovascular:      Rate and Rhythm: Normal rate and regular rhythm.      Pulses: Normal pulses.   Pulmonary:      Effort: Pulmonary effort is normal.      Breath sounds: Normal breath sounds.   Skin:     General: Skin is warm and dry.   Neurological:      Mental Status: She is alert.   Psychiatric:         Mood and Affect: Mood normal.         Behavior: Behavior normal.         Thought Content: Thought content normal.         Judgment: Judgment normal.        Result Review :          Positive for strep  Negative for COVID and influenza a and B         Assessment and Plan   Diagnoses and all orders for this visit:    1. Sore throat " (Primary)  -     POCT SARS-CoV-2 Antigen KIAH + Flu  -     POC Rapid Strep A    2. Strep throat    Other orders  -     amoxicillin-clavulanate (AUGMENTIN) 875-125 MG per tablet; Take 1 tablet by mouth 2 (Two) Times a Day.  Dispense: 14 tablet; Refill: 0    Will treat for strep.  Switch out toothbrush in 3 days.  Alternate Tylenol and ibuprofen as needed.  Use warm salt water gargle as needed.  Follow-up if no resolution         Follow Up   Return if symptoms worsen or fail to improve.  Patient was given instructions and counseling regarding her condition or for health maintenance advice. Please see specific information pulled into the AVS if appropriate.

## 2023-02-20 ENCOUNTER — OFFICE VISIT (OUTPATIENT)
Dept: FAMILY MEDICINE CLINIC | Facility: CLINIC | Age: 48
End: 2023-02-20
Payer: COMMERCIAL

## 2023-02-20 VITALS
SYSTOLIC BLOOD PRESSURE: 142 MMHG | HEIGHT: 65 IN | OXYGEN SATURATION: 98 % | HEART RATE: 90 BPM | WEIGHT: 220.2 LBS | TEMPERATURE: 97.5 F | DIASTOLIC BLOOD PRESSURE: 82 MMHG | BODY MASS INDEX: 36.69 KG/M2

## 2023-02-20 DIAGNOSIS — S60.512A CAT SCRATCH OF LEFT HAND, INITIAL ENCOUNTER: Primary | ICD-10-CM
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
## 2023-02-20 PROCEDURE — 90471 IMMUNIZATION ADMIN: CPT | Performed by: NURSE PRACTITIONER

## 2023-02-20 PROCEDURE — 90715 TDAP VACCINE 7 YRS/> IM: CPT | Performed by: NURSE PRACTITIONER

## 2023-02-20 PROCEDURE — 99213 OFFICE O/P EST LOW 20 MIN: CPT | Performed by: NURSE PRACTITIONER

## 2023-02-20 RX ORDER — AMOXICILLIN AND CLAVULANATE POTASSIUM 875; 125 MG/1; MG/1
1 TABLET, FILM COATED ORAL 2 TIMES DAILY
Qty: 20 TABLET | Refills: 0 | Status: SHIPPED | OUTPATIENT
Start: 2023-02-20

## 2023-02-20 NOTE — PROGRESS NOTES
"Chief Complaint  Abrasion (Cat scratch right hand last night)    Subjective        Torri Marlow presents to [facility]  History of Present Illness     Patient is here today with complaint of cat scratch on right hand last night.  Was her cat.  She is up to date on vaccines.  They hit a deer on a drive and the cat freaked out    12/16/2017 last tdap      Objective   Vital Signs:  /82   Pulse 90   Temp 97.5 °F (36.4 °C)   Ht 165.1 cm (65\")   Wt 99.9 kg (220 lb 3.2 oz)   SpO2 98%   BMI 36.64 kg/m²   Estimated body mass index is 36.64 kg/m² as calculated from the following:    Height as of this encounter: 165.1 cm (65\").    Weight as of this encounter: 99.9 kg (220 lb 3.2 oz).             Physical Exam  Constitutional:       Appearance: Normal appearance.   Skin:     Findings: Erythema (Erythema noted to left medial dorsal part of hand.  2 scabs noted on this.  Very tender to touch.  No streaking noted currently) present.   Neurological:      Mental Status: She is alert and oriented to person, place, and time.   Psychiatric:         Mood and Affect: Mood normal.         Behavior: Behavior normal.         Thought Content: Thought content normal.         Judgment: Judgment normal.        Result Review :                   Assessment and Plan   Diagnoses and all orders for this visit:    1. Cat scratch of left hand, initial encounter (Primary)    Other orders  -     amoxicillin-clavulanate (AUGMENTIN) 875-125 MG per tablet; Take 1 tablet by mouth 2 (Two) Times a Day.  Dispense: 20 tablet; Refill: 0  -     Tdap Vaccine Greater Than or Equal To 6yo IM    Start antibiotic for cat scratch.  I discussed with patient if worsening erythema or streaking noted or any worsening pain to notify provider.  We will proceed with updating Tdap today.  Patient verbalizes understanding and is agreeable         Follow Up   No follow-ups on file.  Patient was given instructions and counseling " regarding her condition or for health maintenance advice. Please see specific information pulled into the AVS if appropriate.

## 2024-01-22 ENCOUNTER — OFFICE VISIT (OUTPATIENT)
Dept: FAMILY MEDICINE CLINIC | Facility: CLINIC | Age: 49
End: 2024-01-22
Payer: COMMERCIAL

## 2024-01-22 VITALS
HEART RATE: 83 BPM | DIASTOLIC BLOOD PRESSURE: 78 MMHG | BODY MASS INDEX: 35.72 KG/M2 | OXYGEN SATURATION: 99 % | WEIGHT: 214.4 LBS | TEMPERATURE: 98.6 F | SYSTOLIC BLOOD PRESSURE: 118 MMHG | HEIGHT: 65 IN

## 2024-01-22 DIAGNOSIS — J02.9 SORE THROAT: Primary | ICD-10-CM

## 2024-01-22 DIAGNOSIS — H60.21 ACUTE MALIGNANT OTITIS EXTERNA OF RIGHT EAR: ICD-10-CM

## 2024-01-22 DIAGNOSIS — J10.1 INFLUENZA A: ICD-10-CM

## 2024-01-22 LAB
EXPIRATION DATE: ABNORMAL
EXPIRATION DATE: NORMAL
FLUAV AG UPPER RESP QL IA.RAPID: DETECTED
FLUBV AG UPPER RESP QL IA.RAPID: NOT DETECTED
INTERNAL CONTROL: ABNORMAL
INTERNAL CONTROL: NORMAL
Lab: ABNORMAL
Lab: NORMAL
S PYO AG THROAT QL: NORMAL
SARS-COV-2 AG UPPER RESP QL IA.RAPID: NOT DETECTED

## 2024-01-22 PROCEDURE — 87880 STREP A ASSAY W/OPTIC: CPT | Performed by: NURSE PRACTITIONER

## 2024-01-22 PROCEDURE — 87428 SARSCOV & INF VIR A&B AG IA: CPT | Performed by: NURSE PRACTITIONER

## 2024-01-22 PROCEDURE — 99214 OFFICE O/P EST MOD 30 MIN: CPT | Performed by: NURSE PRACTITIONER

## 2024-01-22 RX ORDER — DEXTROMETHORPHAN HYDROBROMIDE AND PROMETHAZINE HYDROCHLORIDE 15; 6.25 MG/5ML; MG/5ML
5 SYRUP ORAL 4 TIMES DAILY PRN
Qty: 180 ML | Refills: 0 | Status: SHIPPED | OUTPATIENT
Start: 2024-01-22

## 2024-01-22 RX ORDER — CEFDINIR 300 MG/1
300 CAPSULE ORAL 2 TIMES DAILY
Qty: 14 CAPSULE | Refills: 0 | Status: SHIPPED | OUTPATIENT
Start: 2024-01-22

## 2024-01-22 NOTE — PROGRESS NOTES
"Chief Complaint  Sore Throat    Subjective        Torri Marlow presents to [facility]  History of Present Illness    Patient is here today with complaint of feeling bad a week ago Saturday.      Reports sore throat, fever first two days, ear pain last couple days,  cough, worse at night,   Denies nausea, vomiting, diarrhea, shortness of breath,    Objective   Vital Signs:  /78   Pulse 83   Temp 98.6 °F (37 °C)   Ht 165.1 cm (65\")   Wt 97.3 kg (214 lb 6.4 oz)   SpO2 99%   BMI 35.68 kg/m²   Estimated body mass index is 35.68 kg/m² as calculated from the following:    Height as of this encounter: 165.1 cm (65\").    Weight as of this encounter: 97.3 kg (214 lb 6.4 oz).             Physical Exam  Constitutional:       Appearance: Normal appearance.   HENT:      Head: Normocephalic and atraumatic.      Right Ear: Drainage present. Tympanic membrane is injected, perforated and erythematous. Tympanic membrane has decreased mobility.      Left Ear: Tympanic membrane has decreased mobility.   Cardiovascular:      Rate and Rhythm: Normal rate and regular rhythm.      Pulses: Normal pulses.   Pulmonary:      Effort: Pulmonary effort is normal.      Breath sounds: Normal breath sounds.   Skin:     General: Skin is warm and dry.   Neurological:      Mental Status: She is alert.   Psychiatric:         Mood and Affect: Mood normal.         Behavior: Behavior normal.         Thought Content: Thought content normal.         Judgment: Judgment normal.        Result Review :          Influenza A is positive  Influenza B is negative  Strep is negative  COVID is negative           Assessment and Plan     Diagnoses and all orders for this visit:    1. Sore throat (Primary)  -     POC Rapid Strep A  -     POCT SARS-CoV-2 Antigen KIAH + Flu    2. Influenza A    3. Acute malignant otitis externa of right ear    Other orders  -     cefdinir (OMNICEF) 300 MG capsule; Take 1 capsule by mouth 2 (Two) " Times a Day.  Dispense: 14 capsule; Refill: 0  -     neomycin-polymyxin-hydrocortisone (CORTISPORIN) 3.5-94615-0 otic solution; Administer 3 drops into ear(s) as directed by provider 4 (Four) Times a Day.  Dispense: 10 mL; Refill: 0  -     promethazine-dextromethorphan (PROMETHAZINE-DM) 6.25-15 MG/5ML syrup; Take 5 mL by mouth 4 (Four) Times a Day As Needed for Cough.  Dispense: 180 mL; Refill: 0      Treating for ear infection.  If patient has not having an improvement I instructed her to come back on Friday for reevaluation of the ear.  Start antibiotic oral and drops.  Use cough medicine as needed.  Increase rest is much as possible and fluids.  If no resolution follow-up as needed.       Follow Up     No follow-ups on file.  Patient was given instructions and counseling regarding her condition or for health maintenance advice. Please see specific information pulled into the AVS if appropriate.

## 2025-04-23 ENCOUNTER — OFFICE VISIT (OUTPATIENT)
Dept: FAMILY MEDICINE CLINIC | Facility: CLINIC | Age: 50
End: 2025-04-23
Payer: COMMERCIAL

## 2025-04-23 VITALS
DIASTOLIC BLOOD PRESSURE: 90 MMHG | HEIGHT: 65 IN | OXYGEN SATURATION: 99 % | BODY MASS INDEX: 36.62 KG/M2 | WEIGHT: 219.8 LBS | HEART RATE: 78 BPM | SYSTOLIC BLOOD PRESSURE: 138 MMHG

## 2025-04-23 DIAGNOSIS — B37.9 YEAST INFECTION: ICD-10-CM

## 2025-04-23 DIAGNOSIS — N89.8 VAGINAL ITCHING: Primary | ICD-10-CM

## 2025-04-23 LAB
BACTERIAL VAGINOSIS VAG-IMP: NEGATIVE
BILIRUB BLD-MCNC: NEGATIVE MG/DL
CLARITY, POC: ABNORMAL
COLOR UR: ABNORMAL
EXPIRATION DATE: ABNORMAL
EXPIRATION DATE: NORMAL
GLUCOSE UR STRIP-MCNC: NEGATIVE MG/DL
KETONES UR QL: NEGATIVE
LEUKOCYTE EST, POC: ABNORMAL
Lab: ABNORMAL
Lab: NORMAL
NITRITE UR-MCNC: NEGATIVE MG/ML
PH UR: 6 [PH] (ref 5–8)
PROT UR STRIP-MCNC: NEGATIVE MG/DL
RBC # UR STRIP: NEGATIVE /UL
SP GR UR: 1.02 (ref 1–1.03)
UROBILINOGEN UR QL: NORMAL

## 2025-04-23 PROCEDURE — 99213 OFFICE O/P EST LOW 20 MIN: CPT | Performed by: NURSE PRACTITIONER

## 2025-04-23 PROCEDURE — 81003 URINALYSIS AUTO W/O SCOPE: CPT | Performed by: NURSE PRACTITIONER

## 2025-04-23 PROCEDURE — 87905 IA NZMTC ACTV OTH/THN VIRUS: CPT | Performed by: NURSE PRACTITIONER

## 2025-04-23 RX ORDER — FLUCONAZOLE 150 MG/1
150 TABLET ORAL ONCE
Qty: 2 TABLET | Refills: 0 | Status: SHIPPED | OUTPATIENT
Start: 2025-04-23 | End: 2025-04-23

## 2025-04-23 NOTE — PROGRESS NOTES
"Chief Complaint  Vaginitis (Pt thinks she has a yeast  infection says it feels better now though. )    Subjective        Torri Marlow presents to [facility]  History of Present Illness    Patient is here today with complaint of vaginitis.  Symptoms started last Wednesday.   Was right in the middle of period.    Continued over weekend.  Still itchy.  Mild burning    OTC: monistat 3 helped a little but didn't go away    Objective   Vital Signs:  /90   Pulse 78   Ht 165.1 cm (65\")   Wt 99.7 kg (219 lb 12.8 oz)   SpO2 99%   BMI 36.58 kg/m²   Estimated body mass index is 36.58 kg/m² as calculated from the following:    Height as of this encounter: 165.1 cm (65\").    Weight as of this encounter: 99.7 kg (219 lb 12.8 oz).          Physical Exam  Constitutional:       Appearance: Normal appearance.   Cardiovascular:      Rate and Rhythm: Normal rate and regular rhythm.      Pulses: Normal pulses.   Pulmonary:      Effort: Pulmonary effort is normal.      Breath sounds: Normal breath sounds.   Skin:     General: Skin is warm and dry.   Neurological:      Mental Status: She is alert.   Psychiatric:         Mood and Affect: Mood normal.         Behavior: Behavior normal.         Thought Content: Thought content normal.         Judgment: Judgment normal.        Result Review :             BV is negative    Brief Urine Lab Results  (Last result in the past 365 days)        Color   Clarity   Blood   Leuk Est   Nitrite   Protein   CREAT   Urine HCG        04/23/25 1430 Dark Yellow   Cloudy   Negative   Small (1+)   Negative   Negative                     Assessment and Plan   Diagnoses and all orders for this visit:    1. Vaginal itching (Primary)  -     POCT Bacterial Vaginosis Rapid Test  -     POCT urinalysis dipstick, automated    2. Yeast infection    Other orders  -     fluconazole (DIFLUCAN) 150 MG tablet; Take 1 tablet by mouth 1 (One) Time for 1 dose. May repeat 1 time in 72 hours " if necessary.  Dispense: 2 tablet; Refill: 0      Treat for yeast infection.  Notify provider if no resolution.  She verbalized understanding and is agreeable.       Follow Up   No follow-ups on file.  Patient was given instructions and counseling regarding her condition or for health maintenance advice. Please see specific information pulled into the AVS if appropriate.

## 2025-04-28 ENCOUNTER — OFFICE VISIT (OUTPATIENT)
Dept: FAMILY MEDICINE CLINIC | Facility: CLINIC | Age: 50
End: 2025-04-28
Payer: COMMERCIAL

## 2025-04-28 VITALS
DIASTOLIC BLOOD PRESSURE: 80 MMHG | HEIGHT: 65 IN | BODY MASS INDEX: 36.59 KG/M2 | SYSTOLIC BLOOD PRESSURE: 124 MMHG | OXYGEN SATURATION: 97 % | WEIGHT: 219.6 LBS | TEMPERATURE: 98.4 F | HEART RATE: 79 BPM

## 2025-04-28 DIAGNOSIS — N76.1 SUBACUTE VAGINITIS: Primary | ICD-10-CM

## 2025-04-28 PROCEDURE — 99213 OFFICE O/P EST LOW 20 MIN: CPT | Performed by: NURSE PRACTITIONER

## 2025-04-28 RX ORDER — METRONIDAZOLE 500 MG/1
500 TABLET ORAL 2 TIMES DAILY
Qty: 14 TABLET | Refills: 0 | Status: SHIPPED | OUTPATIENT
Start: 2025-04-28 | End: 2025-05-05

## 2025-04-28 RX ORDER — FLUCONAZOLE 150 MG/1
TABLET ORAL
COMMUNITY
Start: 2025-04-23 | End: 2025-04-28

## 2025-04-28 NOTE — PROGRESS NOTES
"Chief Complaint  Vaginal Itching and Primary Care Follow-Up    Subjective        Torri Marlow presents to [facility]  History of Present Illness    Patient presents today for follow up on vaginal itching and discharge.  We treated with 2 fluconazole on 4/23 and 4/26.  Itching has improved somewhat, but vaginal discharge has not.    Denies any odor that she can smell.    Denies any risk of STD.      Objective   Vital Signs:  /80   Pulse 79   Temp 98.4 °F (36.9 °C)   Ht 165.1 cm (65\")   Wt 99.6 kg (219 lb 9.6 oz)   SpO2 97%   BMI 36.54 kg/m²   Estimated body mass index is 36.54 kg/m² as calculated from the following:    Height as of this encounter: 165.1 cm (65\").    Weight as of this encounter: 99.6 kg (219 lb 9.6 oz).          Physical Exam  Constitutional:       Appearance: Normal appearance.   Cardiovascular:      Rate and Rhythm: Normal rate and regular rhythm.      Pulses: Normal pulses.   Pulmonary:      Effort: Pulmonary effort is normal.      Breath sounds: Normal breath sounds.   Skin:     General: Skin is warm and dry.   Neurological:      Mental Status: She is alert.   Psychiatric:         Mood and Affect: Mood normal.         Behavior: Behavior normal.         Thought Content: Thought content normal.         Judgment: Judgment normal.        Result Review :                Assessment and Plan   Diagnoses and all orders for this visit:    1. Subacute vaginitis (Primary)    Other orders  -     metroNIDAZOLE (Flagyl) 500 MG tablet; Take 1 tablet by mouth 2 (Two) Times a Day.  Dispense: 14 tablet; Refill: 0        Start flagyl.  Instructed patient to update me at on thurday via [employer].  She verbalizes understanding and is agreeable.          Follow Up   No follow-ups on file.  Patient was given instructions and counseling regarding her condition or for health maintenance advice. Please see specific information pulled into the AVS if appropriate.             "

## 2025-05-01 ENCOUNTER — PATIENT MESSAGE (OUTPATIENT)
Dept: FAMILY MEDICINE CLINIC | Facility: CLINIC | Age: 50
End: 2025-05-01
Payer: COMMERCIAL

## 2025-05-01 DIAGNOSIS — N89.8 VAGINAL ITCHING: Primary | ICD-10-CM

## 2025-05-01 DIAGNOSIS — N76.1 SUBACUTE VAGINITIS: ICD-10-CM

## 2025-05-05 RX ORDER — METRONIDAZOLE 7.5 MG/G
1 GEL VAGINAL 2 TIMES DAILY
Qty: 1 EACH | Refills: 0 | Status: SHIPPED | OUTPATIENT
Start: 2025-05-05 | End: 2025-05-12

## 2025-05-28 ENCOUNTER — OFFICE VISIT (OUTPATIENT)
Dept: OBSTETRICS AND GYNECOLOGY | Age: 50
End: 2025-05-28
Payer: COMMERCIAL

## 2025-05-28 VITALS
BODY MASS INDEX: 36.32 KG/M2 | SYSTOLIC BLOOD PRESSURE: 126 MMHG | HEIGHT: 65 IN | WEIGHT: 218 LBS | DIASTOLIC BLOOD PRESSURE: 88 MMHG

## 2025-05-28 DIAGNOSIS — N89.8 VAGINAL DISCHARGE: ICD-10-CM

## 2025-05-28 DIAGNOSIS — Z01.419 WELL FEMALE EXAM WITH ROUTINE GYNECOLOGICAL EXAM: Primary | ICD-10-CM

## 2025-05-28 DIAGNOSIS — Z11.51 SCREENING FOR HUMAN PAPILLOMAVIRUS (HPV): ICD-10-CM

## 2025-05-28 DIAGNOSIS — Z12.31 SCREENING MAMMOGRAM FOR BREAST CANCER: ICD-10-CM

## 2025-05-28 DIAGNOSIS — Z11.3 SCREEN FOR STD (SEXUALLY TRANSMITTED DISEASE): ICD-10-CM

## 2025-05-28 DIAGNOSIS — Z13.89 SCREENING FOR BLOOD OR PROTEIN IN URINE: ICD-10-CM

## 2025-05-28 DIAGNOSIS — Z12.4 SCREENING FOR MALIGNANT NEOPLASM OF CERVIX: ICD-10-CM

## 2025-05-28 PROBLEM — R79.89 ABNORMAL THYROID BLOOD TEST: Status: RESOLVED | Noted: 2020-06-01 | Resolved: 2025-05-28

## 2025-05-28 NOTE — PROGRESS NOTES
[facility]   Obstetrics and Gynecology     2025    Patient: Torri Marlow          MR#:[id number]    History of Present Illness    Chief Complaint   Patient presents with    Gynecologic Exam     NGYN, ongoing vaginal itching, last pap 22 neg, hpv neg, mammo 1/10/23 neg       49 y.o. female  who presents for annual exam.    The patient generally reports feeling well with a 2 to 3-week history of vaginal itching and burning treated with multiple agents by her primary care doctor.  She feels for the most part symptoms are nearly resolved.    Relevant data reviewed:    Patient's last menstrual period was 2025 (approximate).  Obstetric History:  OB History          3    Para   3    Term   3            AB        Living   3         SAB        IAB        Ectopic        Molar        Multiple        Live Births   3               Menstrual History:     Patient's last menstrual period was 2025 (approximate).       Social History     Substance and Sexual Activity   Sexual Activity Yes    Partners: Male    Birth control/protection: Vasectomy     ______________________________________  Patient Active Problem List   Diagnosis    Abnormal thyroid blood test     Past Medical History:   Diagnosis Date    Acute viral pharyngitis     Encounter for preventive health examination     Headache     Ocular migraines    HL (hearing loss) 16 years old    Perforation in right ear drum    Hyperlipidemia     Hypertension     Migraine ?    Ocular migraines    Obesity     Ruptured tympanic membrane     Varicella     As a child. 11 years old.     History reviewed. No pertinent surgical history.  Social History     Tobacco Use   Smoking Status Never    Passive exposure: Never   Smokeless Tobacco Never     Family History   Problem Relation Age of Onset    Hypertension Mother     Heart failure Mother     Pancreatitis Mother     Heart disease Father     Hypertension Father     Hyperlipidemia Father  "    Gout Brother     Heart disease Brother     No Known Problems Other     Cancer Maternal Grandmother         Renal cancer    Cancer Sister         Renal cancer    Cancer Maternal Aunt         Breast cancer    Cancer Maternal Uncle         Renal cancer     Prior to Admission medications    Not on File     _______________________________________    Current contraception: vasectomy  History of abnormal Pap smear: no  Family history of uterine or ovarian cancer: no  Family History of colon cancer/colon polyps: no  History of abnormal mammogram: no  History of abnormal lipids: no    The following portions of the patient's history were reviewed and updated as appropriate: allergies, current medications, past family history, past medical history, past social history, past surgical history, and problem list.    Review of Systems    Pertinent items are noted in HPI.       Objective   Physical Exam    /88   Ht 165.1 cm (65\")   Wt 98.9 kg (218 lb)   LMP 05/07/2025 (Approximate)   BMI 36.28 kg/m²    BP Readings from Last 3 Encounters:   05/28/25 126/88   04/28/25 124/80   04/23/25 138/90      Wt Readings from Last 3 Encounters:   05/28/25 98.9 kg (218 lb)   04/28/25 99.6 kg (219 lb 9.6 oz)   04/23/25 99.7 kg (219 lb 12.8 oz)        BMI: Estimated body mass index is 36.28 kg/m² as calculated from the following:    Height as of this encounter: 165.1 cm (65\").    Weight as of this encounter: 98.9 kg (218 lb).       General: alert, appears stated age, and cooperative   Heart: regular rate and rhythm, S1, S2 normal, no murmur, click, rub or gallop   Lungs: clear to auscultation bilaterally   Abdomen: soft, non-tender, without masses, no organomegaly   Breast: inspection negative, no nipple discharge or bleeding, no masses or nodularity palpable and dense breast tissue    External genitalia/Vulva: External genitalia including bartholin's glands, Urethra, Esperanza's gland and urethra meatus are normal, Perineum, rectum and " anus appear normal , and Bladder appears normal without significant prolapse    Vagina: normal mucosa, normal discharge   Cervix: no lesions   Uterus: normal size and non-tender   Adnexa: exam limited by habitus     As part of wellness and prevention, the following topics were discussed with the patient:  Encouraged self breast exam  Physical activity and regular exercised encouraged.         Problem List   Meds  History  Prep for Surg   Imagin}    Assessment:     Well female exam with routine gynecological exam    Orders:    IGP, Apt HPV,rfx 16 / 18,45    Screening for human papillomavirus (HPV)    Orders:    IGP, Apt HPV,rfx 16 / 18,45    Screening for malignant neoplasm of cervix    Orders:    IGP, Apt HPV,rfx 16 / 18,45    Screening mammogram for breast cancer    Orders:    Mammo Screening Digital Tomosynthesis Bilateral With CAD; Future    Screening for blood or protein in urine    Orders:    POC Urinalysis Dipstick    Screen for STD (sexually transmitted disease)    Orders:    NuSwab VG+ - Swab, Vagina    Vaginal discharge         Plan:  Return in 1 year (on 2026) for Annual exam.    Tony Bedoya MD  2025 07:52 EDT

## 2025-05-29 ENCOUNTER — PATIENT ROUNDING (BHMG ONLY) (OUTPATIENT)
Dept: OBSTETRICS AND GYNECOLOGY | Age: 50
End: 2025-05-29
Payer: COMMERCIAL

## 2025-05-29 NOTE — PROGRESS NOTES
A MY CHART MESSAGE HAS BEEN SENT TO THE PATIENT FOR [facility] ROUNDING.

## 2025-05-30 LAB
A VAGINAE DNA VAG QL NAA+PROBE: NORMAL SCORE
BVAB2 DNA VAG QL NAA+PROBE: NORMAL SCORE
C ALBICANS DNA VAG QL NAA+PROBE: NEGATIVE
C GLABRATA DNA VAG QL NAA+PROBE: NEGATIVE
C TRACH DNA SPEC QL NAA+PROBE: NEGATIVE
CYTOLOGIST CVX/VAG CYTO: NORMAL
CYTOLOGY CVX/VAG DOC CYTO: NORMAL
CYTOLOGY CVX/VAG DOC THIN PREP: NORMAL
DX ICD CODE: NORMAL
HPV I/H RISK 4 DNA CVX QL PROBE+SIG AMP: NEGATIVE
MEGA1 DNA VAG QL NAA+PROBE: NORMAL SCORE
N GONORRHOEA DNA VAG QL NAA+PROBE: NEGATIVE
OTHER STN SPEC: NORMAL
SERVICE CMNT-IMP: NORMAL
STAT OF ADQ CVX/VAG CYTO-IMP: NORMAL
T VAGINALIS DNA VAG QL NAA+PROBE: NEGATIVE

## 2025-06-03 DIAGNOSIS — Z12.31 SCREENING MAMMOGRAM FOR BREAST CANCER: ICD-10-CM

## 2025-08-15 ENCOUNTER — OFFICE VISIT (OUTPATIENT)
Dept: FAMILY MEDICINE CLINIC | Facility: CLINIC | Age: 50
End: 2025-08-15
Payer: COMMERCIAL

## 2025-08-15 VITALS
SYSTOLIC BLOOD PRESSURE: 98 MMHG | HEART RATE: 78 BPM | HEIGHT: 65 IN | WEIGHT: 211 LBS | DIASTOLIC BLOOD PRESSURE: 72 MMHG | OXYGEN SATURATION: 98 % | BODY MASS INDEX: 35.16 KG/M2

## 2025-08-15 DIAGNOSIS — B27.90 INFECTIOUS MONONUCLEOSIS WITHOUT COMPLICATION, INFECTIOUS MONONUCLEOSIS DUE TO UNSPECIFIED ORGANISM: ICD-10-CM

## 2025-08-15 DIAGNOSIS — R21 RASH: Primary | ICD-10-CM

## 2025-08-15 LAB
EXPIRATION DATE: ABNORMAL
EXPIRATION DATE: NORMAL
HETEROPH AB SER QL LA: POSITIVE
INTERNAL CONTROL: ABNORMAL
INTERNAL CONTROL: NORMAL
Lab: ABNORMAL
Lab: NORMAL
S PYO AG THROAT QL: NEGATIVE

## 2025-08-15 PROCEDURE — 99213 OFFICE O/P EST LOW 20 MIN: CPT | Performed by: NURSE PRACTITIONER

## 2025-08-15 PROCEDURE — 87880 STREP A ASSAY W/OPTIC: CPT | Performed by: NURSE PRACTITIONER

## 2025-08-15 PROCEDURE — 86308 HETEROPHILE ANTIBODY SCREEN: CPT | Performed by: NURSE PRACTITIONER

## 2025-08-15 RX ORDER — PREDNISONE 20 MG/1
TABLET ORAL
Qty: 9 TABLET | Refills: 0 | Status: SHIPPED | OUTPATIENT
Start: 2025-08-15 | End: 2025-08-21